# Patient Record
Sex: MALE | Race: WHITE
[De-identification: names, ages, dates, MRNs, and addresses within clinical notes are randomized per-mention and may not be internally consistent; named-entity substitution may affect disease eponyms.]

---

## 2021-08-09 ENCOUNTER — HOSPITAL ENCOUNTER (OUTPATIENT)
Dept: HOSPITAL 61 - KCIC | Age: 66
Discharge: HOME | End: 2021-08-09
Payer: OTHER GOVERNMENT

## 2021-08-09 DIAGNOSIS — Z79.899: ICD-10-CM
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
## 2021-08-09 DIAGNOSIS — M19.012: ICD-10-CM
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
## 2021-08-09 DIAGNOSIS — M75.112: ICD-10-CM
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
## 2021-08-09 DIAGNOSIS — M25.512: Primary | ICD-10-CM

## 2021-08-09 DIAGNOSIS — S43.432A: ICD-10-CM

## 2021-08-09 PROCEDURE — 20610 DRAIN/INJ JOINT/BURSA W/O US: CPT

## 2021-08-09 PROCEDURE — 23350 INJECTION FOR SHOULDER X-RAY: CPT

## 2021-08-09 PROCEDURE — 20553 NJX 1/MLT TRIGGER POINTS 3/>: CPT

## 2021-08-09 PROCEDURE — 77002 NEEDLE LOCALIZATION BY XRAY: CPT

## 2021-08-09 PROCEDURE — 73222 MRI JOINT UPR EXTREM W/DYE: CPT

## 2021-08-09 NOTE — [facility]
IR ARTHROCENT INT JT ASP/INJ LT



History: Reason: CHRONIC LEFT SHOULDER PAIN 



PROCEDURE:

The risks, alternatives, benefits of the procedure discussed with the patient.  Written informed cons
ent is obtained.  A timeout is performed. 



Skin site was chosen under fluoroscopy.  This area is prepped and draped in normal sterile fashion.  
1% Lidocaine is used for superficial and deep local anesthesia.  Using intermittent fluoroscopy, a 22
-gauge spinal needle is advanced into the joint space. Then a dilute gadolinium solution is instilled
, total volume approximately 13 mL.  The needle was removed.  Hemostasis is achieved.  The patient to
lerated the procedure well.  There is no immediate complication.  Patient was transferred to MRI. 



Fluoroscopy time 0.31 minutes

Fluoroscopic images: 2



IMPRESSION:

1.  Fluoroscopically guided left shoulder arthrogram prior to MRI.  



Electronically signed by: Wilfredo Sim DO (8/9/2021 2:43 PM) [id number]

## 2021-08-10 NOTE — [facility]
EXAM: MRI arthrogram left shoulder



DATE: 8/9/2021 2:00 PM



COMPARISON: None



INDICATION: Reason: LEFT SHOULDER PAIN / Spl. Instructions:  / History: Chronic but worsening left sh
oulder pain.



TECHNIQUE: Multiplanar, multisequence MRI arthrogram of the left shoulder was performed following the
 administration of intra-articular gadolinium contrast. Please see separate procedure report for full
 details.



FINDINGS:

Iatrogenic distention of the left glenohumeral joint with gadolinium contrast. Trace subacromial subd
eltoid bursal edema without gadolinium characteristics, bursitis.



AC joint degenerative changes are seen with inferior projecting osteophytes. Associated edema and cys
tic change. Type I acromion.



Shallow rim rent tear of the supraspinatus tendon approximately 1.4 cm in AP dimension, 10 percent th
ickness. Moderate fatty atrophy of the teres minor with associated edema. Otherwise rotator cuff musc
le signal and bulk is normal without fatty atrophy.



Mild intra-articular long head biceps tendinosis. Long head biceps tendon is otherwise intact. The po
sterior superior labrum is diminutive although no discrete tear is seen.



Chondral thinning within the superior glenoid. Small inferior humeral head osteophyte.



IMPRESSION:

1.  Shallow rim rent tear of the supraspinatus tendon involving approximately 10 percent articular si
ded thickness, measuring 1.4 cm in AP dimension.

2.  No discrete labral tear is seen. However the posterior superior labrum is diminutive, likely dege
nerative.

3.  Atrophy and edema of the teres minor without discrete mass in the quadrilateral space.

4.  Left glenohumeral joint and AC joint osteoarthritis.



Electronically signed by: Jono Contreras MD (8/10/2021 8:27 AM) SHAWN